# Patient Record
Sex: FEMALE | Race: WHITE | Employment: OTHER | URBAN - NONMETROPOLITAN AREA
[De-identification: names, ages, dates, MRNs, and addresses within clinical notes are randomized per-mention and may not be internally consistent; named-entity substitution may affect disease eponyms.]

---

## 2020-11-16 PROBLEM — G31.84 MILD COGNITIVE IMPAIRMENT: Status: ACTIVE | Noted: 2020-11-16

## 2020-11-16 PROBLEM — F99 INSOMNIA DUE TO OTHER MENTAL DISORDER: Status: ACTIVE | Noted: 2020-11-16

## 2020-11-16 PROBLEM — F20.9 SCHIZOPHRENIC DISORDER (HCC): Status: ACTIVE | Noted: 2020-11-16

## 2020-11-16 PROBLEM — F51.05 INSOMNIA DUE TO OTHER MENTAL DISORDER: Status: ACTIVE | Noted: 2020-11-16

## 2020-11-16 PROBLEM — F31.9 BIPOLAR DISORDER (HCC): Status: ACTIVE | Noted: 2020-11-16

## 2020-12-10 PROBLEM — F17.210 NICOTINE DEPENDENCE, CIGARETTES, UNCOMPLICATED: Status: ACTIVE | Noted: 2020-12-10

## 2020-12-10 PROBLEM — Z96.652 PRESENCE OF LEFT ARTIFICIAL KNEE JOINT: Status: ACTIVE | Noted: 2020-12-10

## 2021-01-21 PROBLEM — I87.2 VENOUS INSUFFICIENCY (CHRONIC) (PERIPHERAL): Status: ACTIVE | Noted: 2021-01-21

## 2021-02-01 PROBLEM — I89.0 LYMPHEDEMA OF BOTH LOWER EXTREMITIES: Status: ACTIVE | Noted: 2021-02-01

## 2021-02-04 ENCOUNTER — HOSPITAL ENCOUNTER (OUTPATIENT)
Dept: PHYSICAL THERAPY | Age: 61
Setting detail: THERAPIES SERIES
Discharge: HOME | End: 2021-02-04
Payer: COMMERCIAL

## 2021-02-04 PROCEDURE — 97140 MANUAL THERAPY 1/> REGIONS: CPT

## 2021-02-04 PROCEDURE — 97161 PT EVAL LOW COMPLEX 20 MIN: CPT

## 2021-02-05 NOTE — PROGRESS NOTES
Phone: 085 [facility]          Fax: [phone]                      Outpatient Physical Therapy                                                                      Evaluation  Date: 2021  Patient: Eva Rater  : 1960  [facility] #: [id number]  Referring Practitioner: Dr. Lucia Chawla    Referral Date : 21     Medical Diagnosis: BLE venous insufficiency    Treatment Diagnosis: BLE lymphedema  Onset Date: 21  PT Insurance Information: [facility]  Total # of Visits Approved: 12   Total # of Visits to Date: 1     Subjective  Subjective: Pt has a history of chronic BLE venous insufficiency with BLE edema/lymphedema. Pt reports the past 6 weeks her legs have gotten worse. Pt denies any hisotry of cellulitis, neuropathy or wounds,  Additional Pertinent Hx: OA, depression             Objective     Observation/Palpation  Edema: R LE midfoot 23.3cm, ankle 28.3cm, 3\" above 32.2cm, 6\" above 42.5cm, calf 46.5cm, knee 49.5cm: L LE midfoot 24cm, ankle 26.6cm, 3\" above 36.8cm, 6\" above 46.2cm, midcalf 48.4cm, knee 52.4cm            Strength RLE  Comment: grosclaribely 4-/5  Strength LLE  Comment: grossly 4-/5         Functional Outcome Measures      Any of your usual work, housework, or school activities: Moderate Difficulty  Your usual hobbies, recreational, or sporting activities: Moderate Difficulty  Getting into or out of the bath: A Little Bit of Difficulty  Walking between rooms: A Little Bit of Difficulty  Putting on your shoes or socks: Quite a Bit of Difficulty  Squatting: Extreme Difficulty or Unable to Perform Activity  Lifting an object, like a bag of groceries from the floor:  Moderate Difficulty  Performing light activities around your home: Quite a Bit of Difficulty  Performing heavy activities around your home: Extreme Difficulty or Unable to Perform Activity  Getting into or out of a car: Quite a Bit of Difficulty

## 2021-02-05 NOTE — PLAN OF CARE
[facility]           Phone: [phone]             Outpatient Physical Therapy  Fax: [phone]                                           Date: 2021  Patient: Tutu Bass : 1960 CSN #: [id number]   Referring Practitioner:  Dr. Shonda Walter Referral Date:  21       [x] Plan of Care   [] Updated Plan of Care    Dates of Service to Include: 2021 to 21    Diagnosis:  BLE venous insufficiency    Rehab (Treatment) Diagnosis:  BLE lymphedema             Onset Date:  21    Attendance  Total # of Visits to Date: 1        Assessment  Assessment: Pt is a 63year old female that presents with chronic venous insufficiency with BLE lymphedema.  Pt will benefit from skilled PT for lymphedema management      Goals  Short term goals  Time Frame for Short term goals: 3 weeks  Short term goal 1: Pt will be educated on her POC and HEP  Short term goal 2: pt will initiate pump protocol  Long term goals  Time Frame for Long term goals : 6 weeks  Long term goal 1: Pt will be safe and independent with her HEP  Long term goal 2: Pt will demonstrate 2cm decrease in  BLE lymphedema  Long term goal 3: Pt will be fitted for at home pumps  Long term goal 4: Pt will report 70% improvement in symptoms     Prognosis  Prognosis: Good    Treatment Plan   Times per week: 2x/wk  Plan weeks: 4-6 weeks  [] HP/CP      [] Electrical Stim   [x] Therapeutic Exercise      [] Gait Training  [] Aquatics   [] Ultrasound         [x] Patient Education/HEP   [x] Manual Therapy  [] Traction    [] Neuro-tiffany        [] Soft Tissue Mobs            [] Home TENS  [] Iontophoresis    [] Orthotic casting/fitting      [x] lymphedema management              Electronically signed by: Stephen Rosales, PT, DPT    Date: 2021      ______________________________________ Date: 2021   Physician Signature

## 2021-02-09 ENCOUNTER — HOSPITAL ENCOUNTER (OUTPATIENT)
Dept: PHYSICAL THERAPY | Age: 61
Setting detail: THERAPIES SERIES
Discharge: HOME | End: 2021-02-09
Payer: COMMERCIAL

## 2021-02-09 PROCEDURE — 97140 MANUAL THERAPY 1/> REGIONS: CPT

## 2021-02-09 PROCEDURE — 97110 THERAPEUTIC EXERCISES: CPT

## 2021-02-09 NOTE — PROGRESS NOTES
Phone: [facility]           Fax: [phone]                           Outpatient Physical Therapy                                                                            Daily Note    Patient: Salo Henderson : 1960  [facility] #: [id number]   Referring Practitioner:  Dr. Lindsay Lai    Referral Date : 21     Date: 2021    Diagnosis: BLE venous insufficiency  Treatment Diagnosis: BLE lymphedema    Onset Date: 21  PT Insurance Information: [facility]  Total # of Visits Approved: 12 Per Physician Order  Total # of Visits to Date: 2  No Show: 0  Canceled Appointment: 0      Pre-Treatment Pain:  0/10  Subjective: Pt reports feeling ok today, denies pain today. Exercises:       Manual:  Other: MLD to B LE    Modalities:          Assessment  Assessment: MLD perfomed to B LE, followed by pumps. Pt tolerated pumps for 60 min at 30 mmHg. Activity Tolerance       Patient Education  Educated pt on compression throughout day. Pt verbalized/demonstrated good understanding:     [x] Yes         [] No, pt required further clarification.        Post Treatment Pain:  0/10      Plan  Times per week: 2x/wk  Plan weeks: 4-6 weeks      Goals  (Total # of Visits to Date: 2)      Short term goals  Time Frame for Short term goals: 3 weeks  Short term goal 1: Pt will be educated on her POC and HEP  Short term goal 2: pt will initiate pump protocol - progressing    Long term goals  Time Frame for Long term goals : 6 weeks  Long term goal 1: Pt will be safe and independent with her HEP  Long term goal 2: Pt will demonstrate 2cm decrease in  BLE lymphedema  Long term goal 3: Pt will be fitted for at home pumps  Long term goal 4: Pt will report 70% improvement in symptoms    Minutes Tracking:  Time In: 902  Time Out: 1030  Minutes: [address]     Date: 2021

## 2021-02-11 ENCOUNTER — HOSPITAL ENCOUNTER (OUTPATIENT)
Dept: PHYSICAL THERAPY | Age: 61
Setting detail: THERAPIES SERIES
Discharge: HOME | End: 2021-02-11
Payer: COMMERCIAL

## 2021-02-11 PROCEDURE — 97110 THERAPEUTIC EXERCISES: CPT

## 2021-02-11 PROCEDURE — 97140 MANUAL THERAPY 1/> REGIONS: CPT

## 2021-02-16 ENCOUNTER — APPOINTMENT (OUTPATIENT)
Dept: PHYSICAL THERAPY | Age: 61
End: 2021-02-16
Payer: COMMERCIAL

## 2021-02-18 ENCOUNTER — APPOINTMENT (OUTPATIENT)
Dept: PHYSICAL THERAPY | Age: 61
End: 2021-02-18
Payer: COMMERCIAL

## 2021-02-19 NOTE — PROGRESS NOTES
Phone: [facility]           Fax: [phone]                           Outpatient Physical Therapy                                                                            Daily Note    Patient: Antonella Lamar : 1960  [facility] #: [id number]   Referring Practitioner:  Dr. Sebastien Harper    Referral Date : 21     Date: 2021    Diagnosis: BLE venous insufficiency  Treatment Diagnosis: BLE lymphedema    Onset Date: 21  PT Insurance Information: [facility]  Total # of Visits Approved: 12 Per Physician Order  Total # of Visits to Date: 3      Pre-Treatment Pain: 2/10  Subjective: Pt reports she really can tell the difference with pumps    Exercises:  Exercise 1: Pt educated on keeping legs compressed at 20-30mmHg, elevated, performing gentle LE exercises daily, and reducing sodium    Manual:  Other: MLD to B LE      Assessment  Assessment: MLD to BLE, pt tolerated pumps at 35mmHG this date for 60 minutes with no complaints      Patient Education  Patient Education: pt educated on keeping legs compressed while at home  Pt verbalized/demonstrated good understanding:     [x] Yes         [] No, pt required further clarification.        Post Treatment Pain:  0/10      Plan  Times per week: 2x/wk  Plan weeks: 4-6 weeks      Goals  (Total # of Visits to Date: 3)      Short term goals  Time Frame for Short term goals: 3 weeks  Short term goal 1: Pt will be educated on her POC and HEP  Short term goal 2: pt will initiate pump protocol - progressing    Long term goals  Time Frame for Long term goals : 6 weeks  Long term goal 1: Pt will be safe and independent with her HEP  Long term goal 2: Pt will demonstrate 2cm decrease in  BLE lymphedema  Long term goal 3: Pt will be fitted for at home pumps  Long term goal 4: Pt will report 70% improvement in symptoms    Minutes Tracking:  Time In: 934  Time Out: 1100  Minutes: 86  Timed Code Treatment Minutes: 84 Minutes Eriberto Slater PT, DPT     Date: 2/11/2021

## 2021-02-23 ENCOUNTER — HOSPITAL ENCOUNTER (OUTPATIENT)
Dept: PHYSICAL THERAPY | Age: 61
Setting detail: THERAPIES SERIES
Discharge: HOME | End: 2021-02-23
Payer: COMMERCIAL

## 2021-02-23 PROCEDURE — 97140 MANUAL THERAPY 1/> REGIONS: CPT

## 2021-02-23 PROCEDURE — 97110 THERAPEUTIC EXERCISES: CPT

## 2021-02-23 NOTE — PROGRESS NOTES
Phone: [facility]           Fax: [phone]                           Outpatient Physical Therapy                                                                            Daily Note    Patient: Dennis Giordano : 1960  [facility] #: [id number]   Referring Practitioner:  Dr. Ena Dodd    Referral Date : 21     Date: 2021    Diagnosis: BLE venous insufficiency  Treatment Diagnosis: BLE lymphedema    Onset Date: 21  PT Insurance Information: [facility]  Total # of Visits Approved: 12 Per Physician Order  Total # of Visits to Date: 4  No Show: 0  Canceled Appointment: 0      Pre-Treatment Pain:  0/10  Subjective: Pt states she can tell this is really helping. Pt denies pain today. Exercises:  Exercise 1: Pt educated on keeping legs compressed at 20-30mmHg, elevated, performing gentle LE exercises daily, and reducing sodium    Manual:  Other: MLD to B LE    Modalities:          Assessment  Assessment: MLD to B LE completed. Pt tolerated pumps at 40 mmHg for 60 min this date. Activity Tolerance       Patient Education  Reviewed elevation and compression    Pt verbalized/demonstrated good understanding:     [x] Yes         [] No, pt required further clarification.        Post Treatment Pain:  0/10      Plan  Times per week: 2x/wk  Plan weeks: 4-6 weeks      Goals  (Total # of Visits to Date: 4)      Short term goals  Time Frame for Short term goals: 3 weeks  Short term goal 1: Pt will be educated on her POC and HEP  Short term goal 2: pt will initiate pump protocol - progressing    Long term goals  Time Frame for Long term goals : 6 weeks  Long term goal 1: Pt will be safe and independent with her HEP - progressing  Long term goal 2: Pt will demonstrate 2cm decrease in  BLE lymphedema  Long term goal 3: Pt will be fitted for at home pumps  Long term goal 4: Pt will report 70% improvement in symptoms    Minutes Tracking:  Time In: 0900  Time Out: 0785 Minutes: 80           Lm Zachary PTA     Date: 2/23/2021

## 2021-02-25 ENCOUNTER — HOSPITAL ENCOUNTER (OUTPATIENT)
Dept: PHYSICAL THERAPY | Age: 61
Setting detail: THERAPIES SERIES
Discharge: HOME | End: 2021-02-25
Payer: COMMERCIAL

## 2021-02-25 PROCEDURE — 97110 THERAPEUTIC EXERCISES: CPT

## 2021-02-25 PROCEDURE — 97140 MANUAL THERAPY 1/> REGIONS: CPT

## 2021-03-01 ENCOUNTER — HOSPITAL ENCOUNTER (OUTPATIENT)
Dept: PHYSICAL THERAPY | Age: 61
Setting detail: THERAPIES SERIES
Discharge: HOME | End: 2021-03-01
Payer: COMMERCIAL

## 2021-03-01 PROCEDURE — 97140 MANUAL THERAPY 1/> REGIONS: CPT

## 2021-03-01 PROCEDURE — 97110 THERAPEUTIC EXERCISES: CPT

## 2021-03-04 ENCOUNTER — HOSPITAL ENCOUNTER (OUTPATIENT)
Dept: PHYSICAL THERAPY | Age: 61
Setting detail: THERAPIES SERIES
Discharge: HOME | End: 2021-03-04
Payer: COMMERCIAL

## 2021-03-04 PROCEDURE — 97140 MANUAL THERAPY 1/> REGIONS: CPT

## 2021-03-04 PROCEDURE — 97110 THERAPEUTIC EXERCISES: CPT

## 2021-03-04 NOTE — PROGRESS NOTES
Phone: [facility]           Fax: [phone]                           Outpatient Physical Therapy                                                                            Daily Note    Patient: Cheryle Leon : 1960  [facility] #: [id number]   Referring Practitioner:  Dr. Chelsea Anaya    Referral Date : 21     Date: 3/1/2021    Diagnosis: BLE venous insufficiency  Treatment Diagnosis: BLE lymphedema    Onset Date: 21  PT Insurance Information: [facility]  Total # of Visits Approved: 12 Per Physician Order  Total # of Visits to Date: 6      Pre-Treatment Pain:  2/10  Subjective: Pt reported she felt that her R EL was swelling this date    Exercises:  Exercise 1: Pt educated on keeping legs compressed at 20-30mmHg, elevated, performing gentle LE exercises daily, and reducing sodium    Manual:  Other: MLD to B LE      Assessment  Assessment: PT was able to tolerated 50mmHG for 60 minutes, continue with MLD to BLE. PT will reach out to rep for pump to see where things are at      Patient Education  Patient Education: educated on continuing until she receives pumps  Pt verbalized/demonstrated good understanding:     [x] Yes         [] No, pt required further clarification.        Post Treatment Pain:  2/10      Plan  Times per week: 2x/wk  Plan weeks: 4-6 weeks      Goals  (Total # of Visits to Date: 6)      Short term goals  Time Frame for Short term goals: 3 weeks  Short term goal 1: Pt will be educated on her POC and HEP  Short term goal 2: pt will initiate pump protocol - progressing    Long term goals  Time Frame for Long term goals : 6 weeks  Long term goal 1: Pt will be safe and independent with her HEP - progressing  Long term goal 2: Pt will demonstrate 2cm decrease in  BLE lymphedema  Long term goal 3: Pt will be fitted for at home pumps  Long term goal 4: Pt will report 70% improvement in symptoms    Minutes Tracking:  Time In: 8348  Time Out: 4910 Minutes: 86  Timed Code Treatment Minutes: 82 Minutes        Janiya Valdez PT, DPT      Date: 3/1/2021

## 2021-03-04 NOTE — PROGRESS NOTES
Phone: [facility]           Fax: [phone]                           Outpatient Physical Therapy                                                                            Daily Note    Patient: Olinda Krueger : 1960  [facility] #: [id number]   Referring Practitioner:  Dr. Tanja Agustin    Referral Date : 21     Date: 3/4/2021    Diagnosis: BLE venous insufficiency  Treatment Diagnosis: BLE lymphedema    Onset Date: 21  PT Insurance Information: [facility]  Total # of Visits Approved: 12 Per Physician Order  Total # of Visits to Date: 7      Pre-Treatment Pain:  0/10  Subjective: pt reported her feet felt numb yesterday    Exercises:  Exercise 1: Pt educated on keeping legs compressed at 20-30mmHg, elevated, performing gentle LE exercises daily, and reducing sodium    Manual:  Other: MLD to B LE      Assessment  Assessment: PT tolerated 60mmHg this date. MEasurement post pumps R LE midfoot 24.3cm, ankle 28.1cm, 3\" above 29.3cm, 6\" above 41cm, midcalf 43.8cm, knee 45.6cm: L LE midfoot 24.8cm, ankle 29.9cm, 3\" above 28cm, 6\" above 40.2cm, midcalf 44.5cm, knee 46.6cm Pt educated on talking to her PCP about foot numbness and getting a current A1C      Patient Education  Patient Education: educated on getting an order for an A1C  Pt verbalized/demonstrated good understanding:     [x] Yes         [] No, pt required further clarification.        Post Treatment Pain:  0/10      Plan  Times per week: 2x/wk  Plan weeks: 4-6 weeks      Goals  (Total # of Visits to Date: 7)      Short term goals  Time Frame for Short term goals: 3 weeks  Short term goal 1: Pt will be educated on her POC and HEP  Short term goal 2: pt will initiate pump protocol - progressing    Long term goals  Time Frame for Long term goals : 6 weeks  Long term goal 1: Pt will be safe and independent with her HEP - progressing  Long term goal 2: Pt will demonstrate 2cm decrease in  BLE lymphedema Long term goal 3: Pt will be fitted for at home pumps  Long term goal 4: Pt will report 70% improvement in symptoms    Minutes Tracking:  Time In: 6609  Time Out: Osvaldo 178  Minutes: 83  Timed Code Treatment Minutes: 80 1200 [facility] PT, DPT      Date: 3/4/2021

## 2021-03-08 ENCOUNTER — HOSPITAL ENCOUNTER (OUTPATIENT)
Dept: PHYSICAL THERAPY | Age: 61
Setting detail: THERAPIES SERIES
Discharge: HOME | End: 2021-03-08
Payer: COMMERCIAL

## 2021-03-08 PROCEDURE — 97140 MANUAL THERAPY 1/> REGIONS: CPT

## 2021-03-08 PROCEDURE — 97110 THERAPEUTIC EXERCISES: CPT

## 2021-03-11 ENCOUNTER — HOSPITAL ENCOUNTER (OUTPATIENT)
Dept: PHYSICAL THERAPY | Age: 61
Setting detail: THERAPIES SERIES
Discharge: HOME | End: 2021-03-11
Payer: COMMERCIAL

## 2021-03-11 PROCEDURE — 97110 THERAPEUTIC EXERCISES: CPT

## 2021-03-11 PROCEDURE — 97140 MANUAL THERAPY 1/> REGIONS: CPT

## 2021-03-11 NOTE — PROGRESS NOTES
Phone: [facility]           Fax: [phone]                           Outpatient Physical Therapy                                                                            Daily Note    Patient: Jay Burns : 1960  [facility] #: [id number]   Referring Practitioner:  Dr. Genet David    Referral Date : 21     Date: 3/8/2021    Diagnosis: BLE venous insufficiency  Treatment Diagnosis: BLE lymphedema    Onset Date: 21  PT Insurance Information: [facility]  Total # of Visits Approved: 12 Per Physician Order  Total # of Visits to Date: 8      Pre-Treatment Pain:  1/10  Subjective: Pt reported her ankles felt more swollen this date    Exercises:  Exercise 1: Pt educated on keeping legs compressed at 20-30mmHg, elevated, performing gentle LE exercises daily, and reducing sodium    Manual:  Other: MLD to B LE        Assessment  Assessment: Pt tolerated treatment well, pt tolerated pumps at 60mmHg for 60 minutes. Pt has been compliant with compression. Activity Tolerance       Patient Education  Patient Education: reviewed BLE exercises  Pt verbalized/demonstrated good understanding:     [x] Yes         [] No, pt required further clarification.        Post Treatment Pain:  2/10      Plan  Times per week: 2x/wk  Plan weeks: 4-6 weeks      Goals  (Total # of Visits to Date: 8)      Short term goals  Time Frame for Short term goals: 3 weeks  Short term goal 1: Pt will be educated on her POC and HEP  Short term goal 2: pt will initiate pump protocol - progressing    Long term goals  Time Frame for Long term goals : 6 weeks  Long term goal 1: Pt will be safe and independent with her HEP - progressing  Long term goal 2: Pt will demonstrate 2cm decrease in  BLE lymphedema  Long term goal 3: Pt will be fitted for at home pumps  Long term goal 4: Pt will report 70% improvement in symptoms    Minutes Tracking:  Time In: 902  Time Out: [facility] 6764  Minutes: 2 [facility] Chucky PT, DPT      Date: 3/8/2021 18

## 2021-03-11 NOTE — PROGRESS NOTES
Phone: [facility]           Fax: [phone]                           Outpatient Physical Therapy                                                                            Daily Note    Patient: Cheryle Leon : 1960  [facility] #: [id number]   Referring Practitioner:  Dr. Chelsea Anaya    Referral Date : 21     Date: 3/11/2021    Diagnosis: BLE venous insufficiency  Treatment Diagnosis: BLE lymphedema    Onset Date: 21  PT Insurance Information: [facility]  Total # of Visits Approved: 12 Per Physician Order  Total # of Visits to Date: 9      Pre-Treatment Pain:  0/10  Subjective: Pt reported her feet feel great today    Exercises:  Exercise 1: Pt educated on keeping legs compressed at 20-30mmHg, elevated, performing gentle LE exercises daily, and reducing sodium    Manual:  Other: MLD to B LE    Assessment  Assessment: PT talked to rep, will start process with getting pumps    Patient Education  Patient Education: talk to pt about pumps  Pt verbalized/demonstrated good understanding:     [x] Yes         [] No, pt required further clarification.        Post Treatment Pain:  0/10      Plan  Times per week: 2x/wk  Plan weeks: 4-6 weeks      Goals  (Total # of Visits to Date: 5)      Short term goals  Time Frame for Short term goals: 3 weeks  Short term goal 1: Pt will be educated on her POC and HEP  Short term goal 2: pt will initiate pump protocol - progressing    Long term goals  Time Frame for Long term goals : 6 weeks  Long term goal 1: Pt will be safe and independent with her HEP - progressing  Long term goal 2: Pt will demonstrate 2cm decrease in  BLE lymphedema  Long term goal 3: Pt will be fitted for at home pumps  Long term goal 4: Pt will report 70% improvement in symptoms    Minutes Tracking:  Time In: 932  Time Out: 1051  Minutes: 79  Timed Code Treatment Minutes: 76 1200 [facility] PT, DPT      Date: 3/11/2021

## 2021-03-15 ENCOUNTER — HOSPITAL ENCOUNTER (OUTPATIENT)
Dept: PHYSICAL THERAPY | Age: 61
Setting detail: THERAPIES SERIES
Discharge: HOME | End: 2021-03-15
Payer: COMMERCIAL

## 2021-03-15 PROCEDURE — 97140 MANUAL THERAPY 1/> REGIONS: CPT

## 2021-03-15 PROCEDURE — 97110 THERAPEUTIC EXERCISES: CPT

## 2021-03-15 NOTE — PROGRESS NOTES
Phone: [facility]           Fax: [phone]                           Outpatient Physical Therapy                                                                            Daily Note    Patient: Joelle Mckeon : 1960  [facility] #: [id number]   Referring Practitioner:  Dr. Jodie Linder    Referral Date : 21     Date: 3/15/2021    Diagnosis: BLE venous insufficiency  Treatment Diagnosis: BLE lymphedema    Onset Date: 21  PT Insurance Information: [facility]  Total # of Visits Approved: 12 Per Physician Order  Total # of Visits to Date: 10  No Show: 0  Canceled Appointment: 0      Pre-Treatment Pain:  2/10  Subjective: Pt. reports L hip regon having some noted pain at times but denies any other pain at this time. Pt. stated she feel alta swelling in LEs has decreased with pump use. Exercises:  Exercise 1: Pt educated on keeping legs compressed at 20-30mmHg, elevated, performing gentle LE exercises daily, and reducing sodium    Manual:  Other: MLD to B LE    Modalities:          Assessment  Assessment: Pt. able to verbalize and demonstrate HEP IND. Pt. able to tolerate 60' of compression pumps at 60mmHg. Activity Tolerance  Activity Tolerance: Patient Tolerated treatment well    Patient Education  Patient Education: HEP review  Pt verbalized/demonstrated good understanding:     [x] Yes         [] No, pt required further clarification.        Post Treatment Pain: 0 /10      Plan  Times per week: 2x/wk  Plan weeks: 4-6 weeks      Goals  (Total # of Visits to Date: 8)      Short term goals  Time Frame for Short term goals: 3 weeks  Short term goal 1: Pt will be educated on her POC and HEP-MET  Short term goal 2: pt will initiate pump protocol - progressing    Long term goals  Time Frame for Long term goals : 6 weeks  Long term goal 1: Pt will be safe and independent with her HEP - progressing  Long term goal 2: Pt will demonstrate 2cm decrease in  BLE lymphedema  Long term goal 3: Pt will be fitted for at home pumps  Long term goal 4: Pt will report 70% improvement in symptoms    Minutes Tracking:  Time In: 0900  Time Out: [place]  Minutes: 85           Steve Fox     Date: 3/15/2021

## 2021-03-18 ENCOUNTER — HOSPITAL ENCOUNTER (OUTPATIENT)
Dept: PHYSICAL THERAPY | Age: 61
Setting detail: THERAPIES SERIES
Discharge: HOME | End: 2021-03-18
Payer: COMMERCIAL

## 2021-03-18 PROCEDURE — 97140 MANUAL THERAPY 1/> REGIONS: CPT

## 2021-03-18 PROCEDURE — 97110 THERAPEUTIC EXERCISES: CPT

## 2021-03-22 ENCOUNTER — HOSPITAL ENCOUNTER (OUTPATIENT)
Dept: PHYSICAL THERAPY | Age: 61
Setting detail: THERAPIES SERIES
Discharge: HOME | End: 2021-03-22
Payer: COMMERCIAL

## 2021-03-22 PROCEDURE — 97140 MANUAL THERAPY 1/> REGIONS: CPT

## 2021-03-22 PROCEDURE — 97110 THERAPEUTIC EXERCISES: CPT

## 2021-03-22 ASSESSMENT — PAIN SCALES - GENERAL: PAINLEVEL_OUTOF10: 2

## 2021-03-22 NOTE — PROGRESS NOTES
independent with her HEP - progressing  Long term goal 2: Pt will demonstrate 2cm decrease in  BLE lymphedema  Long term goal 3: Pt will be fitted for at home pumps  Long term goal 4: Pt will report 70% improvement in symptoms    Minutes Tracking:  Time In: 8941  Time Out: [address]  Minutes: 86           Cindy Salazar     Date: 3/22/2021

## 2021-03-23 NOTE — PROGRESS NOTES
Phone: [facility]           Fax: [phone]                           Outpatient Physical Therapy                                                                            Daily Note    Patient: Jay Burns : 1960  [facility] #: [id number]   Referring Practitioner:  Dr. Genet David    Referral Date : 21     Date: 3/18/2021    Diagnosis: BLE venous insufficiency  Treatment Diagnosis: BLE lymphedema    Onset Date: 21  PT Insurance Information: [facility]  Total # of Visits Approved: 12 Per Physician Order         Pre-Treatment Pain:  0/10  Subjective: pt denied pain this date    Exercises:  Exercise 1: Pt educated on keeping legs compressed at 20-30mmHg, elevated, performing gentle LE exercises daily, and reducing sodium    Manual:  Other: MLD to B LE      Assessment  Assessment: MLD performed to BLE, no issues this date, still waiting for A1C results, discussed compications of smoking with lymphedema      Patient Education  Patient Education: discussed lymphedema and smoking  Pt verbalized/demonstrated good understanding:     [x] Yes         [] No, pt required further clarification.        Post Treatment Pain:  0/10      Plan  Times per week: 2x/wk  Plan weeks: 4-6 weeks      Goals  ( )      Short term goals  Time Frame for Short term goals: 3 weeks  Short term goal 1: Pt will be educated on her POC and HEP-MET  Short term goal 2: pt will initiate pump protocol - progressing*    Long term goals  Time Frame for Long term goals : 6 weeks  Long term goal 1: Pt will be safe and independent with her HEP - progressing  Long term goal 2: Pt will demonstrate 2cm decrease in  BLE lymphedema  Long term goal 3: Pt will be fitted for at home pumps  Long term goal 4: Pt will report 70% improvement in symptoms    Minutes Tracking:  Time In: 933  Time Out: [address] 178  Minutes: 84  Timed Code Treatment Minutes: 80 Minutes        Chucky Potts PT, DPT      Date: 3/18/2021

## 2021-03-25 ENCOUNTER — HOSPITAL ENCOUNTER (OUTPATIENT)
Dept: PHYSICAL THERAPY | Age: 61
Setting detail: THERAPIES SERIES
Discharge: HOME | End: 2021-03-25
Payer: COMMERCIAL

## 2021-03-25 PROCEDURE — 97140 MANUAL THERAPY 1/> REGIONS: CPT

## 2021-03-25 PROCEDURE — 97110 THERAPEUTIC EXERCISES: CPT

## 2021-03-29 ENCOUNTER — HOSPITAL ENCOUNTER (OUTPATIENT)
Dept: PHYSICAL THERAPY | Age: 61
Setting detail: THERAPIES SERIES
Discharge: HOME | End: 2021-03-29
Payer: COMMERCIAL

## 2021-03-29 PROCEDURE — 97140 MANUAL THERAPY 1/> REGIONS: CPT

## 2021-03-29 PROCEDURE — 97110 THERAPEUTIC EXERCISES: CPT

## 2021-03-29 NOTE — PROGRESS NOTES
Phone: Luciano           Fax: [phone]                           Outpatient Physical Therapy                                                                            Daily Note    Patient: Jagdeep Shah : 1960  [facility] #: [id number]   Referring Practitioner:  Dr. Elina Azar    Referral Date : 21     Date: 3/29/2021    Diagnosis: BLE venous insufficiency  Treatment Diagnosis: BLE lymphedema    Onset Date: 21  PT Insurance Information: [facility]  Total # of Visits Approved: 12 Per Physician Order  Total # of Visits to Date: 12  No Show: 0  Canceled Appointment: 0      Pre-Treatment Pain:  0/10  Subjective: Pt. denies pain upon arrival. Pt. stated she quit smoking on Friday and ahs not smoked in 3 days. Exercises:  Exercise 1: Pt educated on keeping legs compressed at 20-30mmHg, elevated, performing gentle LE exercises daily, and reducing sodium    Manual:  Other: MLD to B LE    Modalities:          Assessment  Assessment: MLD to performed to B LE. Pt cynthia to tolerate 60' of compression pumps at 60mmHg. Meausrements taken this date L Le: foot=25.1cm, ankel =28.2cm, 3\" 29.5cm, 6\"=38cm, 9\"=44.7cm, 12\"=44.9cm. R LE: foot=24.2cm, ankle = 27cm, 3\"=28.4cm, 6\"=35.2cm, 9\"=43.2cm, 12\"=44.4cm    Activity Tolerance  Activity Tolerance: Patient Tolerated treatment well    Patient Education  Patient Education: discussed lymphedema and smoking  Pt verbalized/demonstrated good understanding:     [x] Yes         [] No, pt required further clarification.        Post Treatment Pain:  0/10      Plan  Times per week: 2x/wk  Plan weeks: 4-6 weeks      Goals  (Total # of Visits to Date: 15)      Short term goals  Time Frame for Short term goals: 3 weeks  Short term goal 1: Pt will be educated on her POC and HEP-MET  Short term goal 2: pt will initiate pump protocol - progressing    Long term goals  Time Frame for Long term goals : 6 weeks  Long term goal 1: Pt will be safe and independent with her HEP - progressing  Long term goal 2: Pt will demonstrate 2cm decrease in  BLE lymphedema  Long term goal 3: Pt will be fitted for at home pumps  Long term goal 4: Pt will report 70% improvement in symptoms    Minutes Tracking:  Time In: 0845  Time Out: 1018  Minutes: 93           Rolo Engle     Date: 3/29/2021

## 2021-04-01 ENCOUNTER — HOSPITAL ENCOUNTER (OUTPATIENT)
Dept: PHYSICAL THERAPY | Age: 61
Setting detail: THERAPIES SERIES
Discharge: HOME | End: 2021-04-01
Payer: COMMERCIAL

## 2021-04-01 PROCEDURE — 97140 MANUAL THERAPY 1/> REGIONS: CPT

## 2021-04-01 PROCEDURE — 97110 THERAPEUTIC EXERCISES: CPT

## 2021-04-01 NOTE — PROGRESS NOTES
Phone: Luciano           Fax: [phone]                           Outpatient Physical Therapy                                                                            Daily Note    Patient: Lilliana Pedroza : 1960  [facility] #: [id number]   Referring Practitioner:  Dr. Urmila Fournier    Referral Date : 21     Date: 2021    Diagnosis: BLE venous insufficiency  Treatment Diagnosis: BLE lymphedema    Onset Date: 21  PT Insurance Information: [facility]  Total # of Visits Approved: 12 Per Physician Order  Total # of Visits to Date: 13  No Show: 0  Canceled Appointment: 0      Pre-Treatment Pain:  0/10  Subjective: Pt. reports she still has not smoked since last Friday but has had cravings. Denies pain at this time. Exercises:  Exercise 1: Pt educated on keeping legs compressed at 20-30mmHg, elevated, performing gentle LE exercises daily, and reducing sodium    Manual:  Other: MLD to B LE    Modalities:          Assessment  Assessment: Pt. tolerated tx. well. Pt. on 60mmHg for 60' with compression pumps. Activity Tolerance  Activity Tolerance: Patient Tolerated treatment well    Patient Education  Patient Education: HEP  Pt verbalized/demonstrated good understanding:     [x] Yes         [] No, pt required further clarification.        Post Treatment Pain:  0/10      Plan  Times per week: 2x/wk  Plan weeks: 4-6 weeks      Goals  (Total # of Visits to Date: 15)      Short term goals  Time Frame for Short term goals: 3 weeks  Short term goal 1: Pt will be educated on her POC and HEP-MET  Short term goal 2: pt will initiate pump protocol - progressing    Long term goals  Time Frame for Long term goals : 6 weeks  Long term goal 1: Pt will be safe and independent with her HEP - progressing  Long term goal 2: Pt will demonstrate 2cm decrease in  BLE lymphedema-progressing  Long term goal 3: Pt will be fitted for at home pumps  Long term goal 4: Pt will report 70% improvement in symptoms    Minutes Tracking:  Time In: 6048  Time Out: 1033  Minutes: 88           Lilliana Sea     Date: 4/1/2021

## 2021-04-02 NOTE — PROGRESS NOTES
[facility]           Fax: [phone]                           Outpatient Physical Therapy                                                                            Daily Note    Patient: Isaac Sweet : 1960  [facility] #: [id number]   Referring Practitioner:  Dr. Crispin Allen    Referral Date : 21     Date: 3/25/2021    Diagnosis: BLE venous insufficiency  Treatment Diagnosis: BLE lymphedema    Onset Date: 21  PT Insurance Information: [facility]  Total # of Visits Approved: 18 Per Physician Order         Pre-Treatment Pain:  0/10       Exercises:  Exercise 1: Pt educated on keeping legs compressed at 20-30mmHg, elevated, performing gentle LE exercises daily, and reducing sodium    Manual:  Other: MLD to B LE        Assessment  Assessment: MLD to BLE, pt had slight discoloration prior to treatment but skin look good with good cap refill post treatment      Patient Education  Patient Education: educated on contributors to lymphedema  Pt verbalized/demonstrated good understanding:     [x] Yes         [] No, pt required further clarification.        Post Treatment Pain:  0/10      Plan  Times per week: 2x/wk  Plan weeks: 4-6 weeks      Goals  ( )      Short term goals  Time Frame for Short term goals: 3 weeks  Short term goal 1: Pt will be educated on her POC and HEP-MET  Short term goal 2: pt will initiate pump protocol - progressing    Long term goals  Time Frame for Long term goals : 6 weeks  Long term goal 1: Pt will be safe and independent with her HEP - progressing  Long term goal 2: Pt will demonstrate 2cm decrease in  BLE lymphedema-progressing  Long term goal 3: Pt will be fitted for at home pumps-met  Long term goal 4: Pt will report 70% improvement in symptoms-progressing    Minutes Tracking:  Time In: 933  Time Out: 1100  Minutes: 87  Timed Code Treatment Minutes: 85 Minutes        Xu Gimenez PT, DPT      Date: 3/25/2021

## 2021-04-05 ENCOUNTER — HOSPITAL ENCOUNTER (OUTPATIENT)
Dept: PHYSICAL THERAPY | Age: 61
Setting detail: THERAPIES SERIES
Discharge: HOME | End: 2021-04-05
Payer: COMMERCIAL

## 2021-04-05 PROCEDURE — 97140 MANUAL THERAPY 1/> REGIONS: CPT

## 2021-04-05 PROCEDURE — 97110 THERAPEUTIC EXERCISES: CPT

## 2021-04-08 NOTE — PROGRESS NOTES
Phone: Luciano           Fax: [phone]                           Outpatient Physical Therapy                                                                            Daily Note    Patient: Tatyana Irizarry : 1960  [facility] #: [id number]   Referring Practitioner:  Dr. Demetri Trejo    Referral Date : 21     Date: 2021    Diagnosis: BLE venous insufficiency  Treatment Diagnosis: BLE lymphedema    Onset Date: 21  PT Insurance Information: [facility]  Total # of Visits Approved: 18 Per Physician Order  Total # of Visits to Date: 14      Pre-Treatment Pain: 0/10  Subjective: pt reported she still hasn't smoked and cut down her sugar. Pt is boarderline diabetic with an A1C of 5.7    Exercises:  Exercise 1: Pt educated on keeping legs compressed at 20-30mmHg, elevated, performing gentle LE exercises daily, and reducing sodium    Manual:  Other: MLD to B LE    Assessment  Assessment: Pt's legs looked good this date, no pitting edeam noted and good color. Pt will be recieving her pump on       Patient Education  Patient Education: educated that her pump will be delivered   Pt verbalized/demonstrated good understanding:     [x] Yes         [] No, pt required further clarification.        Post Treatment Pain:  0/10      Plan  Times per week: 2x/wk  Plan weeks: 4-6 weeks      Goals  (Total # of Visits to Date: 15)      Short term goals  Time Frame for Short term goals: 3 weeks  Short term goal 1: Pt will be educated on her POC and HEP-MET  Short term goal 2: pt will initiate pump protocol - progressing    Long term goals  Time Frame for Long term goals : 6 weeks  Long term goal 1: Pt will be safe and independent with her HEP - progressing  Long term goal 2: Pt will demonstrate 2cm decrease in  BLE lymphedema-progressing  Long term goal 3: Pt will be fitted for at home pumps-met  Long term goal 4: Pt will report 70% improvement in symptoms-progressing    Minutes Tracking:  Time In: 9499  Time Out: 1000  Minutes: 77  Timed Code Treatment Minutes: 75 1200 [facility] PT, DPT      Date: 4/5/2021

## 2021-04-12 ENCOUNTER — APPOINTMENT (OUTPATIENT)
Dept: PHYSICAL THERAPY | Age: 61
End: 2021-04-12
Payer: COMMERCIAL

## 2021-04-15 ENCOUNTER — APPOINTMENT (OUTPATIENT)
Dept: PHYSICAL THERAPY | Age: 61
End: 2021-04-15
Payer: COMMERCIAL

## 2021-05-20 ENCOUNTER — HOSPITAL ENCOUNTER (OUTPATIENT)
Dept: PHYSICAL THERAPY | Age: 61
Setting detail: THERAPIES SERIES
Discharge: HOME | End: 2021-05-20
Payer: COMMERCIAL

## 2021-05-20 PROCEDURE — 97140 MANUAL THERAPY 1/> REGIONS: CPT

## 2021-05-21 NOTE — PROGRESS NOTES
Phone: Luciano           Fax: [phone]                           Outpatient Physical Therapy                                                                            Daily Note    Patient: Gladis Pradhan : 1960  [facility] #: [id number]   Referring Practitioner:  Dr Soumya Marinelli    Referral Date : 21     Date: 2021    Diagnosis: BLE venous insuffciency  Treatment Diagnosis: BLE lymphedema    Onset Date: 21  PT Insurance Information: [facility]  Total # of Visits Approved: 18 Per Physician Order  Total # of Visits to Date: 15      Pre-Treatment Pain:  0/10  Subjective: pt reports she has been doing her pumps twice a day and is noticing improvements in her legs and decreased BLE numbness. Pt reports she started smoking again    Exercises:  Exercise 1: Pt educated on keeping legs compressed at 20-30mmHg, elevated, performing gentle LE exercises daily, and reducing sodium        Assessment  Assessment: Measurements this date R LE midfoot 24.7cm, ankle 28.6cm, 3\" above 30cm, 6\" above 38.9cm, midcalf 43.2cm, knee 45.7cm, L LE midfoot 24.5cm, ankle 28.6cm, 3\" above 30.5cm, 6\" above 39.9cm, midcalf 44cm, knee 45.8cm. Pt will be seen for a 3 month follow up      Patient Education  Patient Education: educated on 3 month follow up  Pt verbalized/demonstrated good understanding:     [x] Yes         [] No, pt required further clarification.        Post Treatment Pain:  0/10      Plan  Times per week: 2x/wk  Plan weeks: 4-6 weeks      Goals  (Total # of Visits to Date: 13)      Short term goals  Time Frame for Short term goals: 3 weeks  Short term goal 1: Pt will be educated on her POC and HEP-MET  Short term goal 2: met    Long term goals  Time Frame for Long term goals : 6 weeks  Long term goal 1: Pt will be safe and independent with her HEP - progressing  Long term goal 2: Pt will demonstrate 2cm decrease in  BLE lymphedema-progressing  Long term goal 3: Pt will be fitted for at home pumps-met  Long term goal 4: Pt will report 70% improvement in symptoms-progressing    Minutes Tracking:  Time In: 1000  Time Out: 1026  Minutes: 26  Timed Code Treatment Minutes: 95 Ruth Lopez PT, DPT     Date: 5/20/2021

## 2021-05-21 NOTE — PLAN OF CARE
[facility]           Phone: [phone]             Outpatient Physical Therapy  Fax: [phone]                                           Date: 2021  Patient: Hernando Meehan : 1960 CSN #: [id number]   Referring Practitioner:  Dr Katlyn Mc Referral Date:  21       [] Plan of Care   [x] Updated Plan of Care    Dates of Service to Include: 2021 to 21    Diagnosis:  BLE venous insuffciency    Rehab (Treatment) Diagnosis:  BLE lymphedema             Onset Date:  21    Attendance  Total # of Visits to Date: 15        Assessment  Assessment: Measurements this date R LE midfoot 24.7cm, ankle 28.6cm, 3\" above 30cm, 6\" above 38.9cm, midcalf 43.2cm, knee 45.7cm, L LE midfoot 24.5cm, ankle 28.6cm, 3\" above 30.5cm, 6\" above 39.9cm, midcalf 44cm, knee 45.8cm.  Pt will be seen for a 3 month follow up      Goals  Short term goals  Time Frame for Short term goals: 3 weeks  Short term goal 1: Pt will be educated on her POC and HEP-MET  Short term goal 2: met  Long term goals  Time Frame for Long term goals : 6 weeks  Long term goal 1: Pt will be safe and independent with her HEP - progressing  Long term goal 2: Pt will demonstrate 2cm decrease in  BLE lymphedema-progressing  Long term goal 3: Pt will be fitted for at home pumps-met  Long term goal 4: Pt will report 70% improvement in symptoms-progressing     Prognosis  Prognosis: Good    Treatment Plan   Times per week: 2x/wk  Plan weeks: 4-6 weeks  [] HP/CP      [] Electrical Stim   [x] Therapeutic Exercise      [] Gait Training  [] Aquatics   [] Ultrasound         [x] Patient Education/HEP   [x] Manual Therapy  [] Traction    [] Neuro-tiffany        [] Soft Tissue Mobs            [] Home TENS  [] Iontophoresis    [] Orthotic casting/fitting      [x] lymphedema management              Electronically signed by: Jennifer Cantu, PT, DPT    Date: 5/20/2021      ______________________________________ Date: 5/20/2021   Physician Signature

## 2021-08-02 ENCOUNTER — HOSPITAL ENCOUNTER (OUTPATIENT)
Dept: PHYSICAL THERAPY | Age: 61
Setting detail: THERAPIES SERIES
Discharge: HOME | End: 2021-08-02
Payer: COMMERCIAL

## 2021-08-02 PROCEDURE — 97140 MANUAL THERAPY 1/> REGIONS: CPT

## 2021-08-03 NOTE — PROGRESS NOTES
Phone: Luciano           Fax: [phone]                           Outpatient Physical Therapy                                                                            Daily Note    Patient: Monique Gutierrez : 1960  [facility] #: [id number]   Referring Practitioner:  Dr Baltazar Guerrier    Referral Date : 21     Date: 2021    Diagnosis: BLE venous insuffciency  Treatment Diagnosis: BLE lymphedema    Onset Date: 21  PT Insurance Information: [facility]  Total # of Visits Approved: 18 Per Physician Order  Total # of Visits to Date: 16      Pre-Treatment Pain:  0/10  Subjective: pt states she has been compliant with her pump use and states her legs seems to be better. Pt states she is still smoking but has decreased her sugar and salt intake      Manual:  MLD to BLE and measurements     Assessment  Assessment: Pt was seen for a 3 month follow ups and overall edema was down. Pt is managing her lymphedema independently and will be D/C this date. Pt's measurements this date R LE midfoot 24cm, ankle 25.2cm, 3\" above 28.7cm, 6\" above 35.3cm, midcalf 46.1cm, knee 49.6cm : LLE midfoot 24cm, ankle 25.5cm, 3\" above 29.2cm, 6\" above 38.8cm, midcalf 45.3cm, knee 47.1cm        Patient Education  Patient Education: d/C this date  Pt verbalized/demonstrated good understanding:     [x] Yes         [] No, pt required further clarification.        Post Treatment Pain:  0/10      Plan    D/C this date  Goals  (Total # of Visits to Date: 12)      Short term goals  Time Frame for Short term goals: 3 weeks  Short term goal 1: Pt will be educated on her POC and HEP-MET    Long term goals  Time Frame for Long term goals : 6 weeks  Long term goal 1: Pt will be safe and independent with her HEP - progressing  Long term goal 2: Pt will demonstrate 2cm decrease in  BLE lymphedema-progressing  Long term goal 3: Pt will be fitted for at home pumps-met  Long term goal 4: Pt will report 70% improvement in symptoms-progressing    Minutes Tracking:  Time In: 2647  Time Out: 975 [facility]  Minutes: 500 Bing Nelson PT DPT     Date: 8/2/2021

## 2021-08-03 NOTE — DISCHARGE SUMMARY
Phone: Luciano          Fax: [phone]                            Outpatient Physical Therapy                                                                    Discharge Summary    Patient: Earlene Urban  : 1960  [facility] #: [id number]   Referring physician: No admitting provider for patient encounter. Referring Practitioner: Dr Wanda Rosas      Diagnosis: BLE venous insuffciency      Date Treatment Initiated: 2021  Date of Last Treatment: 2021      PT Visit Information  Onset Date: 21  PT [facility]  Total # of Visits Approved: 18  Total # of Visits to Date: 16  Plan of Care/Certification Expiration Date: 21      Frequency/Duration   1-2 times per week   4-6 weeks      Treatment Received  [] HP/CP      [] Electrical Stim   [x] Therapeutic Exercise      [] Gait Training  [] Aquatics   [] Ultrasound         [x] Patient Education/HEP   [x] Manual Therapy  [] Traction    [] Neuro-tiffany        [] Soft Tissue Mobs            [] Home TENS  [] Iontophoresis    [] Orthotic casting/fitting      [x] lymphedema management    Assessment  Assessment: Pt was seen for a 3 month follow ups and overall edema was down. Pt is managing her lymphedema independently and will be D/C this date.  Pt's measurements this date R LE midfoot 24cm, ankle 25.2cm, 3\" above 28.7cm, 6\" above 35.3cm, midcalf 46.1cm, knee 49.6cm : LLE midfoot 24cm, ankle 25.5cm, 3\" above 29.2cm, 6\" above 38.8cm, midcalf 45.3cm, knee 47.1cm       Goals  Short term goals  Time Frame for Short term goals: 3 weeks  Short term goal 1: Pt will be educated on her POC and HEP-MET    Long term goals  Time Frame for Long term goals : 6 weeks  Long term goal 1: Pt will be safe and independent with her HEP - progressing  Long term goal 2: Pt will demonstrate 2cm decrease in  BLE lymphedema-progressing  Long term goal 3: Pt will be fitted for at home pumps-met  Long term goal 4: Pt will report 70% improvement in symptoms-progressing      Reason for Discharge  [x] Goals Achieved                        []  Poor Follow Through/Attendance                  [x]  Optimal Function Achieved     []  Patient Discharged Self    []  Hospitalization                         []  Physician discharge      Thank you for this referral      Sheila Shen, PT, DPT               Date: 8/3/2021

## 2021-08-03 NOTE — PLAN OF CARE
[facility]           Phone: [phone]             Outpatient Physical Therapy  Fax: [phone]                                           Date: 2021  Patient: Sandeep Santa : 1960 CSN #: [id number]   Referring Practitioner:  Dr Leslye Shahid Referral Date:  21       [] Plan of Care   [x] Updated Plan of Care    Dates of Service to Include: 2021 to 21    Diagnosis:  BLE venous insuffciency    Rehab (Treatment) Diagnosis:  BLE lymphedema             Onset Date:  21    Attendance  Total # of Visits to Date: 16        Assessment  Assessment: Pt was seen for a 3 month follow ups and overall edema was down. Pt is managing her lymphedema independently and will be D/C this date.  Pt's measurements this date R LE midfoot 24cm, ankle 25.2cm, 3\" above 28.7cm, 6\" above 35.3cm, midcalf 46.1cm, knee 49.6cm : LLE midfoot 24cm, ankle 25.5cm, 3\" above 29.2cm, 6\" above 38.8cm, midcalf 45.3cm, knee 47.1cm      Goals  Short term goals  Time Frame for Short term goals: 3 weeks  Short term goal 1: Pt will be educated on her POC and HEP-MET  Long term goals  Time Frame for Long term goals : 6 weeks  Long term goal 1: Pt will be safe and independent with her HEP - progressing  Long term goal 2: Pt will demonstrate 2cm decrease in  BLE lymphedema-progressing  Long term goal 3: Pt will be fitted for at home pumps-met  Long term goal 4: Pt will report 70% improvement in symptoms-progressing     Prognosis  Prognosis: Good    Treatment Plan         [] HP/CP      [] Electrical Stim   [x] Therapeutic Exercise      [] Gait Training  [] Aquatics   [] Ultrasound         [x] Patient Education/HEP   [x] Manual Therapy  [] Traction    [] Neuro-tiffany        [] Soft Tissue Mobs            [] Home TENS  [] Iontophoresis    [] Orthotic casting/fitting      [x] lymphedema management             Electronically signed by: Oswaldo Castro PT, DPT    Date: 8/2/2021      ______________________________________ Date: 8/2/2021   Physician Signature

## 2021-12-20 PROBLEM — R73.03 PREDIABETES: Status: ACTIVE | Noted: 2021-12-20

## 2021-12-20 PROBLEM — I10 PRIMARY HYPERTENSION: Status: ACTIVE | Noted: 2021-12-20

## 2022-11-03 PROBLEM — B35.1 DERMATOPHYTOSIS OF NAIL: Status: ACTIVE | Noted: 2022-11-03

## 2022-11-03 PROBLEM — G60.3 IDIOPATHIC PROGRESSIVE POLYNEUROPATHY: Status: ACTIVE | Noted: 2022-11-03

## 2022-11-30 PROBLEM — R14.0 BLOATING: Status: ACTIVE | Noted: 2022-11-30

## 2022-11-30 PROBLEM — R10.84 GENERALIZED ABDOMINAL PAIN: Status: ACTIVE | Noted: 2022-11-30

## 2022-11-30 PROBLEM — R19.7 DIARRHEA: Status: ACTIVE | Noted: 2022-11-30

## 2023-01-30 ENCOUNTER — HOSPITAL ENCOUNTER (OUTPATIENT)
Dept: CARDIAC CATH/INVASIVE PROCEDURES | Age: 63
Discharge: HOME | End: 2023-01-30
Payer: COMMERCIAL

## 2023-01-30 VITALS
SYSTOLIC BLOOD PRESSURE: 131 MMHG | TEMPERATURE: 98.1 F | OXYGEN SATURATION: 95 % | RESPIRATION RATE: 17 BRPM | HEART RATE: 76 BPM | WEIGHT: 288 LBS | BODY MASS INDEX: 51.03 KG/M2 | HEIGHT: 63 IN | DIASTOLIC BLOOD PRESSURE: 91 MMHG

## 2023-01-30 PROCEDURE — 7100000001 HC PACU RECOVERY - ADDTL 15 MIN

## 2023-01-30 PROCEDURE — 2580000003 HC RX 258: Performed by: INTERNAL MEDICINE

## 2023-01-30 PROCEDURE — 7100000000 HC PACU RECOVERY - FIRST 15 MIN

## 2023-01-30 PROCEDURE — 99153 MOD SED SAME PHYS/QHP EA: CPT

## 2023-01-30 PROCEDURE — C1894 INTRO/SHEATH, NON-LASER: HCPCS

## 2023-01-30 PROCEDURE — 6360000002 HC RX W HCPCS

## 2023-01-30 PROCEDURE — 99152 MOD SED SAME PHYS/QHP 5/>YRS: CPT

## 2023-01-30 PROCEDURE — 2709999900 HC NON-CHARGEABLE SUPPLY

## 2023-01-30 PROCEDURE — 93458 L HRT ARTERY/VENTRICLE ANGIO: CPT

## 2023-01-30 PROCEDURE — 2500000003 HC RX 250 WO HCPCS

## 2023-01-30 PROCEDURE — 6360000004 HC RX CONTRAST MEDICATION

## 2023-01-30 RX ORDER — AMLODIPINE BESYLATE 5 MG/1
5 TABLET ORAL DAILY
Qty: 30 TABLET | Refills: 3 | Status: SHIPPED | OUTPATIENT
Start: 2023-01-30

## 2023-01-30 RX ORDER — SODIUM CHLORIDE 9 MG/ML
INJECTION, SOLUTION INTRAVENOUS CONTINUOUS
Status: DISCONTINUED | OUTPATIENT
Start: 2023-01-30 | End: 2023-01-31 | Stop reason: HOSPADM

## 2023-01-30 RX ADMIN — SODIUM CHLORIDE: 9 INJECTION, SOLUTION INTRAVENOUS at 11:58

## 2023-01-30 NOTE — DISCHARGE INSTRUCTIONS
DISCHARGE INSTRUCTIONS / Ethan Pearson 1485 to shower in AM. Discontinue pressure dressing and/or armboard in AM.   Do not apply band aids. KEEP SITE CLEAN DRY AND OPEN TO THE AIR. No powder or lotion. Do not soak in a pool or tub and do not swim for one week. If there is any bleeding at the catheter site, apply firm pressure directly over site with your hand until the bleeding stops. If bleeding continues after 3 minutes call 911. If there is any swelling or firm areas at your puncture site, this could be bleeding under the skin(hematoma), and if you have any concerns seek help immediately. Drink plenty of fluids after the test. This will flush the x-ray dye from your system. Return to your normal diet. NO STRENUOUS LIFTING WITH AFFECTED ARM FOR 3 DAYS ANYTHING HEAVIER THAN 8 TO 10 POUNDS    WATCH FOR SIGNS OF INFECTION /  REDNESS / SWELLING / DRAINAGE / Scherrie Plasencia / TEMPERATURE GREATER THAN 101    IF AREA BECOMES HARD AND SWOLLEN AND IF YOU ARE AT ALL CONCERNED SEEK HELP IMMEDIATELY    SEEK HELP IMMEDIATELY IF AFFECTED ARM BECOMES COLD / Levander Ege / SEVERE PAIN / NAILBEDS TURN BLUE     IF ON METFORMIN / GLUCOPHAGE DO NOT RESTART MEDICATION FOR 48 HOURS    CALL 911 if you have symptoms including:   Drooping facial muscles   Changes in vision or speech   Difficulty walking or using your limbs   Change in sensation to affected leg, including numbness, feeling cold, or change in color   Extreme sweating, nausea or vomiting   Dizziness or lightheadedness   Chest pain   Rapid, irregular heartbeat   Palpitations   Cough, shortness of breath, or difficulty breathing   Weakness or fainting   If you think you have an emergency, CALL 911 . SEDATION / ANALGESIA INFORMATION / Jennifer Willis 84 have received the sedation/analgesia medication during your visit    Sedation/analgesia is used during short medical procedures under controlled supervision.  The medication will produce a strong relaxation. You will be able to hear, speak and follow instructions, but your memory and alertness will be decreased. You will be able to swallow and breathe on your own. During sedation/analgesia your blood pressure, heart and breathing will be watched closely. After the procedure, you may not remember what was said or done. You may have the following effects from the medication. \" Drowsiness, dizziness, sleepiness or confusion. \" Difficulty remembering or delayed reaction times. \" Loss of fine muscle control or difficulty with your balance especially while walking. \" Difficulty focusing or blurred vision. You may not be aware of slight changes in your behavior and/or your reaction time because of the medication used during the procedure. Therefore you should follow these instructions. \" Have someone responsible help you with your care. \" Do not drive for 24 hours. \" Do not operate equipment for 24 hours (lawnmowers, power tools, kitchen accessories, stove). \" Do not drink any alcoholic beverages for a minimum of 24 hours. \" Do not make important personal, legal or business decisions for 24 hours. \" You may experience dizziness or lightheadedness. Move slowly and carefully, do not make sudden position changes. \" Drink extra amounts of fluids today. \" Increase your diet as tolerated (unless you have received specific instructions from your doctor). \" If you feel nauseated, continue with liquids until the nausea is gone. \" Notify your physician if you have not urinated within 8 hours after the procedure. \" Resume your medications unless otherwise instructed. Coronary artery disease (CAD) occurs when plaque builds up in the arteries that bring oxygen-rich blood to your heart. Plaque is a fatty substance made of cholesterol, calcium, and other substances in the blood. This process is called hardening of the arteries, or atherosclerosis.   What happens when you have coronary artery disease? Plaque may narrow the coronary arteries. Narrowed arteries cause poor blood flow. This can lead to angina symptoms such as chest pain or discomfort. If blood flow is completely blocked, you could have a heart attack. You can slow CAD and reduce the risk of future problems by making changes in your lifestyle. These include quitting smoking and eating heart-healthy foods. Treatments for CAD, along with changes in your lifestyle, can help you live a longer and healthier life. How can you prevent coronary artery disease? Do not smoke. It may be the best thing you can do to prevent heart disease. If you need help quitting, talk to your doctor about stop-smoking programs and medicines. These can increase your chances of quitting for good. Be active. Get at least 30 minutes of exercise on most days of the week. Walking is a good choice. You also may want to do other activities, such as running, swimming, cycling, or playing tennis or team sports. Eat heart-healthy foods. Eat more fruits and vegetables and less foods that contain saturated and trans fats. Limit alcohol, sodium, and sweets. Stay at a healthy weight. Lose weight if you need to. Manage other health problems such as diabetes, high blood pressure, and high cholesterol. Talk to your doctor about taking a daily aspirin. Manage stress. Stress can hurt your heart. To keep stress low, talk about your problems and feelings. Don't keep your feelings hidden. How is coronary artery disease treated? Your doctor will suggest that you make lifestyle changes. For example, your doctor may ask you to eat healthy foods, quit smoking, lose extra weight, and be more active. You will have to take medicines. Your doctor may suggest a procedure to open narrowed or blocked arteries. This is called angioplasty. Or your doctor may suggest using healthy blood vessels to create detours around narrowed or blocked arteries. This is called bypass surgery.   Follow-up care is a key part of your treatment and safety. Be sure to make and go to all appointments, and call your doctor if you are having problems. It's also a good idea to know your test results and keep a list of the medicines you take. Where can you learn more? Go to https://[employer]. org and sign in to your [employer] account. Enter [phone] in the [employer] box to learn more about Learning About Coronary Artery Disease (CAD).     If you do not have an account, please click on the Sign Up Now link.

## 2023-01-30 NOTE — PROGRESS NOTES
Air removed fromVasc band in  2 mL increments until all air removed. No bleeding or hematoma noted. Pressure dressing  applied, radial pulse palpable. Ambulated to bathroom and in halls. Gait steady. .     All discharge instructions reviewed, questions answered, paper signed and given copy. Patient discharged per wheelchair with  and belongings.

## 2023-01-30 NOTE — OP NOTE
[facility]    CARDIAC CATHETERIZATION    Date:   1/30/2023  Patient name:  Sarai Paul  Date of admission:  1/30/2023 11:07 AM  MRN:   [id number]  YOB: 1960    Operators:  Primary:   Eleonora Keita MD (Attending Physician)      Procedure performed:       [x] Left Heart Catheterization. [] Graft Angiography. [x] Left Ventriculography. [] Right Heart Catheterization. [x] Coronary Angiography. [] Aortic Valve Studies. [] PCI:      [] Other:       Pre Procedure Conscious Sedation Data:  ASA Class:    [] I [] II [x] III [] IV    Mallampati Class:  [x] I [] II [] III [] IV      Indication:  [] STEMI      [x] + Stress test  [] ACS      [x] + EKG Changes  [] Non Q MI       [] Significant Risk Factors  [] Recurrent Angina             [] Diabetes Mellitus    [] New LBBB      [] Uncontrolled HTN. [] CHF / Low EF changes     [] Abnormal CTA / Ca Score      Procedure:  Access:  [] Femoral  [x] Radial  artery       [x] Right  [] Left    Procedure: After informed consent was obtained with explanation of the risks and benefits, patient was brought to the cath lab. The access area was prepped and draped in sterile fashion. 1% lidocaine was used for local block. The artery was cannulated with 6  Fr sheath with brisk arterial blood return. The side port was frequently flushed and aspirated with normal saline. Findings:  \LMCA: Normal 0% stenosis. LAD: Normal 0% stenosis. Mid area bridging with 20% stenosis    LCx: Normal 0% stenosis. RCA: Normal 0% stenosis. Procedure Summary  Non obstructive minimal CAD.   Bridging in mid LAD  Normal LV systolic function    Recommendations  Medical treatments    Estimated Blood Loss: 10 mL  ____________________________________________    History and Risk Factors    [x] Hypertension     [] Family history of CAD  [x] Hyperlipidemia     [] Cerebrovascular Disease   [] Prior MI       [] Peripheral Vascular disease   [] Prior PCI [] Diabetes Mellitus    [] Left Main PCI. [] Currently on Dialysis. [] Prior CABG. [] Currently smoker. [] Cardiac Arrest outside of healthcare facility. [] Yes    [x] No        Witnessed     [] Yes   [] No     Arrest after arrival of EMS  [] Yes   [] No     [] Cardiac Arrest at other Facility. [] Yes   [x] No    Pre-Procedure Information. Heart Failure       [] Yes    [x] No        Class  [] I      [] II  [] III    [] IV. New Diagnosis    [] Yes  [] No    HF Type      [] Systolic   [] Diastolic          [] Unknown. Diagnostic Test:   EKG       [] Normal   [] Abnormal    New antiarrhythmia medications:    [] Yes   [] No   New onset atrial fibrillation / Flutter     [] Yes   [] No   ECG Abnormalities:      [] V. Fib   [] Mackenzie V. Tach           [] NS V. T   [] New LBBB           [] T. Inv  []  ST dev > 0.5 mm         [] PVC's freq  [] PVC's infrequent    Stress Test Performed:      [x] Yes    [] No     Type:     [] Stress Echo   [] Exercise Stress Test (no imaging)      [x] Stress Nuclear  [] Stress Imaging     Results   [] Negative   [x] Positive        [] Indeterminate  [] Unavailable     If Positive/ Risk / Extent of Ischemia:       [x] Low  [] Intermediate         [] High  [] Unavailable      Cardiac CTA Performed:     [x] Yes    [] No      Results   [x] CAD   [] Non obstructive CAD      [] No CAD   [] Uncertain      [] Unknown   [] Structural Disease.      Pre Procedure Medications:   [] Yes    [x] No         [] ASA   [] Beta Blockers      [] Nitrate   [] Ca Channel Blockers      [] Ranolazine   [] Statin       [] Plavix/Others antiplatelets      Electronically signed on 1/30/2023 at 5:12 PM by:    Fady Baltazar MD  Fellow, [address]    I have reviewed the case / procedure with resident / fellow  I have examined the patient personally  Patient agree with treatment plan as discussed before, final arrangement based on my evaluation and exam.    Risk and benefit of procedure planned were explained in details. Procedure was performed by me personally, with all aspect of the procedure being done using standard protocol. Note was modified based on my own assessment and treatment.     Isabel Boas, MD  [facility]

## 2023-01-30 NOTE — PROGRESS NOTES
Patient admitted, consent signed and questions answered. Patient ready for procedure. Call light to reach with side rails up 2 of 2. Bilateral groin areas clipped with writer and Itzel aide present.  at bedside with patient. History and physical needs updated.

## 2023-01-30 NOTE — H&P
Attestation signed by      Attending Physician Statement:    I have discussed the care of  [facility] , including pertinent history and exam findings, with the Cardiology fellow/resident. I have seen and examined the patient and the key elements of all parts of the encounter have been performed by me. I agree with the assessment, plan and orders as documented by the fellow/resident, after I modified exam findings and plan of treatments, and the final version is my approved version of the assessment. Additional Comments: [facility]    Consult / H&P               Today's Date: 1/30/2023  Patient Name: [facility]  Date of admission: 1/30/2023 11:07 AM  Patient's age: 60 y.o., 1960  Admission Dx: Abnormal stress ECG [R94.39]    Reason for Consult:  Cardiac evaluation    Requesting Physician: No admitting provider for patient encounter. CHIEF COMPLAINT:  Elective cath     History Obtained From:  patient    HISTORY OF PRESENT ILLNESS:      The patient is here for elective cath   Had SOB and had CTA done and it was abnormal as below. Past Medical History:   has a past medical history of Bipolar disorder ([facility] [address] COPD (chronic obstructive pulmonary disease) ([facility] [address] Dementia ([facility] [address] Diabetes mellitus ([facility] [address] H/O breast biopsy, Hypothyroidism, and Schizophrenia ([facility] [address] Past Surgical History:   has a past surgical history that includes joint replacement; Hysterectomy, total abdominal; Breast surgery (Left, 2006); Cholecystectomy; Ectopic pregnancy surgery; Colonoscopy (N/A, 06/28/2021); Colonoscopy (N/A, 01/06/2023); and Cardiac catheterization (01/30/2023). Home Medications:    Prior to Admission medications    Medication Sig Start Date End Date Taking?  Authorizing Provider   aspirin EC 81 MG EC tablet Take 1 tablet by mouth daily 1/18/23   Jodie Adler MD   atorvastatin (LIPITOR) 10 MG tablet Take 1 tablet by mouth daily 1/18/23   Jodie Adler MD   donepezil (ARICEPT) 10 MG tablet TAKE ONE TABLET DAILY. 12/5/22   Laurie Jj PA-C   traZODone (DESYREL) 100 MG tablet TAKE ONE TABLET BY MOUTH NIGHTLY. 12/5/22   Laurie Jj PA-C   perphenazine 4 MG tablet Take 4 mg by mouth every evening 11/9/22   LIVIA Reed CNP   LATUDA 120 MG tablet 160 mg nightly 4/30/21   Historical Provider, MD   propranolol (INDERAL) 10 MG tablet 10 mg 2 times daily  9/17/20   Historical Provider, MD   citalopram (CELEXA) 40 MG tablet Take 40 mg by mouth daily  9/17/20   Historical Provider, MD   perphenazine 8 MG tablet Take 8 mg by mouth 2 times daily  9/17/20   Karina Byers APRN - CNP   benztropine (COGENTIN) 2 MG tablet Take 2 mg by mouth 2 times daily  9/17/20   Historical Provider, MD      Current Facility-Administered Medications: 0.9 % sodium chloride infusion, , IntraVENous, Continuous    Allergies:  Patient has no known allergies. Social History:   reports that she has been smoking cigarettes. She has a 45.00 pack-year smoking history. She has never used smokeless tobacco. She reports that she does not drink alcohol and does not use drugs. Family History: family history includes Heart Disease in her brother, mother, and sister. No h/o sudden cardiac death. No for premature CAD    REVIEW OF SYSTEMS:    Constitutional: there has been no unanticipated weight loss. There's been No change in energy level, No change in activity level. Eyes: No visual changes or diplopia. No scleral icterus. ENT: No Headaches  Cardiovascular: No cardiac history  Respiratory: No previous pulmonary problems, No cough  Gastrointestinal: No abdominal pain. No change in bowel or bladder habits. Genitourinary: No dysuria, trouble voiding, or hematuria. Musculoskeletal:  No gait disturbance, No weakness or joint complaints. Integumentary: No rash or pruritis. Neurological: No headache, diplopia, change in muscle strength, numbness or tingling.  No change in gait, balance, coordination, mood, affect, memory, mentation, behavior. Psychiatric: No anxiety, or depression. Endocrine: No temperature intolerance. No excessive thirst, fluid intake, or urination. No tremor. Hematologic/Lymphatic: No abnormal bruising or bleeding, blood clots or swollen lymph nodes. Allergic/Immunologic: No nasal congestion or hives. PHYSICAL EXAM:      /63   Pulse 69   Temp 98.1 °F (36.7 °C) (Oral)   Resp 23   Ht 5' 3\" (1.6 m)   Wt 288 lb (130.6 kg)   SpO2 96%   BMI 51.02 kg/m²    Constitutional and General Appearance: alert, cooperative, no distress and appears stated age  HEENT: PERRL, no cervical lymphadenopathy. No masses palpable. Normal oral mucosa  Respiratory:  Normal excursion and expansion without use of accessory muscles  Resp Auscultation: Good respiratory effort. No for increased work of breathing. On auscultation: clear to auscultation bilaterally  Cardiovascular: The apical impulse is not displaced  Heart tones are crisp and normal. regular S1 and S2.  Jugular venous pulsation Normal  The carotid upstroke is normal in amplitude and contour without delay or bruit  Peripheral pulses are symmetrical and full   Abdomen:   No masses or tenderness  Bowel sounds present  Extremities:   No Cyanosis or Clubbing   Lower extremity edema: No   Skin: Warm and dry  Neurological:  Alert and oriented. Moves all extremities well  No abnormalities of mood, affect, memory, mentation, or behavior are noted    DATA:    Diagnostics:      Holter monitor  12/2022  A total of 1,081,977 beats were analyzed. Average heart rate was   66 bpm.  Minimum heart rate 51 bpm which on review was consistent   with sinus bradycardia. Maximum heart rate 111 bpm which on   review was consistent with sinus tachycardia. Supraventricular ectopic activity consisted of 248 beats (overall   burden <1%). No definite evidence of supraventricular runs   reported.   No definite evidence of atrial flutter or atrial   fibrillation noted. Ventricular ectopic activity consisted of 87 beats (overall   burden <1%). No definite evidence of sustained or nonsustained   ventricular tachycardia. No pauses greater than 3.0 seconds. No definite evidence of   advanced degree or complete heart block noted. There was 1 patient triggered and 1 auto triggered event noted   with underlying rhythm being normal sinus on both. Stress Test: 11/29/2022  Probably normal study. Small size mild severity reversible perfusion defect involving apical segment. There is evidence of significant extracardiac activity and one cannot entirely exclude attenuation artifact versus actual perfusion defect. Otherwise no other definite scintigraphic evidence of reversible or fixed perfusion defects. Gated images demonstrate normal myocardial wall motion and wall thickening. Computed left ventricular ejection fraction is 70-72%. Cardiac Angiography: Obtained, not performed. CTA 1/11/2023  -CCTA 01/11/22:  Ascending Aorta: Measures 29 x 29 mm. Descending Aorta: Measures 22 x 23 mm. Pulmonary Artery: 27 mm. Coronary Artery Dominance: right. Left Main Coronary Artery: Normal.  LAD Coronary Artery: Has about 50% to 75% stenosis in the proximal part at the site of first diagonal branch origin. Diagonal Branch of LAD Coronary Artery: Normal.  Circumflex Coronary Artery: Non-dominant. No significant stenosis. Obtuse Marginal Coronary Artery: Normal.  Right Coronary Artery: Did not fill with the contrast well and I cannot commit on stenosis in the right coronary artery. Labs:     CBC: No results for input(s): WBC, HGB, HCT, PLT in the last 72 hours. BMP: No results for input(s): NA, K, CO2, BUN, CREATININE, LABGLOM, GLUCOSE in the last 72 hours. BNP: No results for input(s): BNP in the last 72 hours. PT/INR: No results for input(s): PROTIME, INR in the last 72 hours. APTT:No results for input(s):  APTT in the last 72 hours. CARDIAC ENZYMES:No results for input(s): CKTOTAL, CKMB, CKMBINDEX, TROPONINI in the last 72 hours. FASTING LIPID PANEL:  Lab Results   Component Value Date/Time    HDL 43 01/11/2023 09:09 AM    LDLCALC 56 01/11/2023 09:09 AM    TRIG 118 01/11/2023 09:09 AM     LIVER PROFILE:No results for input(s): AST, ALT, LABALBU in the last 72 hours. IMPRESSION:    Patient Active Problem List   Diagnosis    Insomnia due to other mental disorder    Bipolar disorder (HCC)    Schizophrenic disorder (HCC)    Mild cognitive impairment    Nicotine dependence, cigarettes, uncomplicated    Presence of left artificial knee joint    Venous insufficiency (chronic) (peripheral)    Lymphedema of both lower extremities    Prediabetes    Primary hypertension    Idiopathic progressive polyneuropathy    Dermatophytosis of nail    Diarrhea    Bloating    Generalized abdominal pain     Abnormal CTA coronary   Abnormal stress test   Palpitations   HTN  HLD  F/H of CAD   Smoker with abnormal lung imaging - 8 mm lung nodule   KERWIN     RECOMMENDATIONS:  Proceed with the planned procedure, further recommendations to follow based on the same.      Rossy Garza MD  Fellow, [id number] [facility]

## 2023-01-30 NOTE — PROGRESS NOTES
Patient received post cath to [facility] room 09. Assessment obtained. Post cath pathway initiated. Right radial site with TR band inflated with 12 mL of air intact. No hematoma noted. Patient without complaints.

## 2023-07-19 PROBLEM — M79.672 PAIN IN BOTH FEET: Status: ACTIVE | Noted: 2023-07-19

## 2023-07-19 PROBLEM — M79.671 PAIN IN BOTH FEET: Status: ACTIVE | Noted: 2023-07-19

## 2023-07-19 PROBLEM — L84 CORNS AND CALLOSITIES: Status: ACTIVE | Noted: 2023-07-19

## 2025-01-13 DIAGNOSIS — R06.02 SOB (SHORTNESS OF BREATH): Primary | ICD-10-CM

## 2025-01-13 DIAGNOSIS — R07.9 CHEST PAIN, UNSPECIFIED TYPE: ICD-10-CM

## 2025-01-23 ENCOUNTER — HOSPITAL ENCOUNTER (OUTPATIENT)
Age: 65
Setting detail: OUTPATIENT SURGERY
Discharge: HOME | End: 2025-01-23
Attending: FAMILY MEDICINE | Admitting: FAMILY MEDICINE
Payer: MEDICARE

## 2025-01-23 VITALS
RESPIRATION RATE: 21 BRPM | DIASTOLIC BLOOD PRESSURE: 125 MMHG | OXYGEN SATURATION: 97 % | TEMPERATURE: 96.6 F | SYSTOLIC BLOOD PRESSURE: 148 MMHG | HEART RATE: 67 BPM

## 2025-01-23 DIAGNOSIS — R06.02 SOB (SHORTNESS OF BREATH): ICD-10-CM

## 2025-01-23 PROBLEM — I27.21 PAH (PULMONARY ARTERY HYPERTENSION) (HCC): Status: ACTIVE | Noted: 2025-01-23

## 2025-01-23 LAB — GLUCOSE BLD-MCNC: 112 MG/DL (ref 74–100)

## 2025-01-23 PROCEDURE — 7100000010 HC PHASE II RECOVERY - FIRST 15 MIN: Performed by: FAMILY MEDICINE

## 2025-01-23 PROCEDURE — 82947 ASSAY GLUCOSE BLOOD QUANT: CPT

## 2025-01-23 PROCEDURE — 2709999900 HC NON-CHARGEABLE SUPPLY: Performed by: FAMILY MEDICINE

## 2025-01-23 PROCEDURE — C1769 GUIDE WIRE: HCPCS | Performed by: FAMILY MEDICINE

## 2025-01-23 PROCEDURE — 7100000011 HC PHASE II RECOVERY - ADDTL 15 MIN: Performed by: FAMILY MEDICINE

## 2025-01-23 PROCEDURE — C1894 INTRO/SHEATH, NON-LASER: HCPCS | Performed by: FAMILY MEDICINE

## 2025-01-23 PROCEDURE — C1887 CATHETER, GUIDING: HCPCS | Performed by: FAMILY MEDICINE

## 2025-01-23 PROCEDURE — 93451 RIGHT HEART CATH: CPT | Performed by: FAMILY MEDICINE

## 2025-01-23 RX ORDER — DIPHENHYDRAMINE HCL 25 MG
50 CAPSULE ORAL ONCE
Status: DISCONTINUED | OUTPATIENT
Start: 2025-01-23 | End: 2025-01-23 | Stop reason: HOSPADM

## 2025-01-23 RX ORDER — NITROGLYCERIN 0.4 MG/1
0.4 TABLET SUBLINGUAL EVERY 5 MIN PRN
Status: DISCONTINUED | OUTPATIENT
Start: 2025-01-23 | End: 2025-01-23 | Stop reason: HOSPADM

## 2025-01-23 RX ORDER — SODIUM CHLORIDE 9 MG/ML
INJECTION, SOLUTION INTRAVENOUS PRN
Status: DISCONTINUED | OUTPATIENT
Start: 2025-01-23 | End: 2025-01-23 | Stop reason: HOSPADM

## 2025-01-23 RX ORDER — GUAIFENESIN/DEXTROMETHORPHAN 100-10MG/5
5 SYRUP ORAL 3 TIMES DAILY PRN
COMMUNITY

## 2025-01-23 RX ORDER — SODIUM CHLORIDE 0.9 % (FLUSH) 0.9 %
5-40 SYRINGE (ML) INJECTION PRN
Status: DISCONTINUED | OUTPATIENT
Start: 2025-01-23 | End: 2025-01-23 | Stop reason: HOSPADM

## 2025-01-23 RX ORDER — SODIUM CHLORIDE 9 MG/ML
INJECTION, SOLUTION INTRAVENOUS CONTINUOUS
Status: DISCONTINUED | OUTPATIENT
Start: 2025-01-23 | End: 2025-01-23 | Stop reason: HOSPADM

## 2025-01-23 RX ORDER — FOLIC ACID 1 MG/1
1 TABLET ORAL DAILY
COMMUNITY

## 2025-01-23 RX ORDER — ACETAMINOPHEN 325 MG/1
650 TABLET ORAL EVERY 4 HOURS PRN
Status: DISCONTINUED | OUTPATIENT
Start: 2025-01-23 | End: 2025-01-23 | Stop reason: HOSPADM

## 2025-01-23 RX ORDER — SODIUM CHLORIDE 0.9 % (FLUSH) 0.9 %
5-40 SYRINGE (ML) INJECTION EVERY 12 HOURS SCHEDULED
Status: DISCONTINUED | OUTPATIENT
Start: 2025-01-23 | End: 2025-01-23 | Stop reason: HOSPADM

## 2025-01-23 NOTE — PROGRESS NOTES
Tip stop to right antecubital procedure site clean and dry.  Discharge instructions reviewed with patient, voices understanding.  Taken by wheelchair to private car driven by spouse, all belongings sent with patient.

## 2025-01-23 NOTE — DISCHARGE INSTRUCTIONS
Discharge Instructions for Right Cardiac Catheterization    A cardiac catheterization is a diagnostic test used to evaluate the health of the heart and its blood vessels. The test is done with a thin catheter carefully threaded into your heart from a leg or arm artery. Most likely, you will be allowed to go home the same day as the procedure.   Steps to Take at Home:   Pain- apply ice to site 15-20 minutes every hour for the first 2 days.   Showering is okay 24 hours after procedure.   Dressing/Tipstop can be taken off in 24 hours after procedure   No soaking in a pool, hot tub, bath tub, or standing water for one week.   Procedure was in ARM:  If hand of procedure site becomes numb, tingly, and/or cold, seek medical help.  Wash area with soap and water daily while leaving it open to air, no bandaids or ointment.  Diet  Return to your normal diet.   Physical Activity   Avoid heavy lifting, physically demanding activities, and sexual activity for 2-3 days. Lift nothing over 10 pounds (a gallon of milk is 8 pounds)..   Medications   Resume taking your normal medicines as advised.   Use acetaminophen (Tylenol) for pain relief.  (Avoid anti-inflammatory drugs such as- ibuprofen (Advil, Motrin), naproxen sodium (Aleve), Excedrin for a few days)  If you had to stop taking these medications before the procedure, ask your doctor when you can resume taking them:   Anti-inflammatory drugs   Blood thinners, such as warfarin (Coumadin)   If you are taking medicines, follow these general guidelines:   Take your medicine as directed. Do not change the amount or the schedule.   Do not stop taking them without talking to your doctor.   Do not share them.   Know the side effects and report any to your doctor.   Some drugs can be dangerous when mixed. Talk to a doctor or pharmacist if you are taking more than one drug. This includes over-the-counter medicine and herb or dietary supplements.   Plan ahead for refills so you don't run  TOKEN:'[id number]',FREE:[LAST:[[facility]],PHONE:[phone]],FAX:[(   )    -],ADDRESS:[[address] [place] [address]]]